# Patient Record
Sex: MALE | Race: WHITE | Employment: PART TIME | URBAN - METROPOLITAN AREA
[De-identification: names, ages, dates, MRNs, and addresses within clinical notes are randomized per-mention and may not be internally consistent; named-entity substitution may affect disease eponyms.]

---

## 2022-01-03 ENCOUNTER — HOSPITAL ENCOUNTER (EMERGENCY)
Age: 22
Discharge: HOME | End: 2022-01-03
Attending: EMERGENCY MEDICINE
Payer: COMMERCIAL

## 2022-01-03 VITALS
WEIGHT: 165 LBS | OXYGEN SATURATION: 98 % | HEART RATE: 89 BPM | HEIGHT: 68 IN | RESPIRATION RATE: 16 BRPM | TEMPERATURE: 99 F | SYSTOLIC BLOOD PRESSURE: 125 MMHG | DIASTOLIC BLOOD PRESSURE: 63 MMHG | BODY MASS INDEX: 25.01 KG/M2

## 2022-01-03 DIAGNOSIS — R22.0 SWELLING OF RIGHT SIDE OF FACE: Primary | ICD-10-CM

## 2022-01-03 PROCEDURE — 99283 EMERGENCY DEPT VISIT LOW MDM: CPT

## 2022-01-03 RX ORDER — PENICILLIN V POTASSIUM 500 MG/1
500 TABLET ORAL 4 TIMES DAILY
Qty: 40 TABLET | Refills: 0 | Status: ON HOLD | OUTPATIENT
Start: 2022-01-03 | End: 2022-01-08 | Stop reason: HOSPADM

## 2022-01-03 ASSESSMENT — PAIN DESCRIPTION - PAIN TYPE: TYPE: ACUTE PAIN

## 2022-01-03 ASSESSMENT — PAIN DESCRIPTION - LOCATION: LOCATION: TEETH

## 2022-01-03 ASSESSMENT — PAIN SCALES - GENERAL: PAINLEVEL_OUTOF10: 4

## 2022-01-03 NOTE — ED PROVIDER NOTES
[address]  eMERGENCY dEPARTMENT eNCOUnter   Independent Attestation     Pt Name: Franck Phillips  MRN: [id number]  [place] 2000  Date of evaluation: 1/3/22       Franck Phillips is a 24 y.o. male who presents with Dental Pain        Based on the medical record, the care appears appropriate. I was personally available for consultation in the Emergency Department.     Marc Elder MD  Attending Emergency  Physician                 Marc Elder MD  01/03/22 9497

## 2022-01-03 NOTE — ED PROVIDER NOTES
[address]  eMERGENCY dEPARTMENT eNCOUnter      Pt Name: Claude Sanderson  MRN: [id number]  [place] 2000  Date of evaluation: 1/3/22      CHIEF COMPLAINT:   Chief Complaint   Patient presents with    Dental Pain     HISTORY OF PRESENT ILLNESS    lCaude Sanderson is a 24 y.o. male who presents with complaints of right sided facial swelling x several days. Reports mild dental pain. Denies fever, chills, nausea, emesis, throat swelling. Pt did have right lower tooth pulled on 12/16. Denies any  Other complaints. REVIEW OF SYSTEMS     Constitutional: Denies recent fever, chills. Eyes: No visual changes. Neck: No neck pain. Respiratory: Denies recent shortness of breath. Cardiac:  Denies recent chest pain. GI: denies any recent abdominal pain nausea or vomiting. Denies Blood in the stool or black tarry stools. : denies dysuria. Musculoskeletal: Denies focal weakness. Neurologic: denies headache or focal weakness. Skin:  Denies any rash. Negative in 10 essential Systems except as mentioned above and in the HPI. PAST MEDICAL HISTORY   PMH:  has no past medical history on file. none otherwise stated in nurses notes  Surgical History:  has no past surgical history on file. none otherwise stated in nurses notes  Social History:   none otherwise stated in nurses notes  Family History: none otherwise stated in nurses notes  Psychiatric History: none otherwise stated in nurses notes    Allergies:has No Known Allergies. PHYSICAL EXAM     INITIAL VITALS: /63   Pulse 89   Temp 99 °F (37.2 °C)   Resp 16   Ht 5' 8\" (1.727 m)   Wt 165 lb (74.8 kg)   SpO2 98%   BMI 25.09 kg/m²   CONSTITUTIONAL: Vital signs reviewed, Alert and oriented X 3. HEAD: Atraumatic, Normocephalic. EYES: Eyes are normal to inspection, Pupils equal, round and reactive to light.    NECK: Normal ROM, No jugular venous distention, No meningeal signs, Cervical spine nontender. MOUTH:  + dental pain at 3 with right sided facial swelling, with no signs of abscess formation or Will sign noted. No swelling involving the airway at all. No trismus. Lips are normal.  No tongue elevation. No tenderness over floor of mouth. Controlling secretions. Speaking in full sentences. RESPIRATORY CHEST: No respiratory distress. ABDOMEN: Abdomen is nontender, No distension. UPPER EXTREMITY: Inspection normal, No cyanosis. NEURO: GCS is 15, Speech normal, Memory normal.   SKIN: Skin is warm, Skin is dry. PSYCHIATRIC: Oriented X 3, Normal affect. EMERGENCY DEPARTMENT COURSE:   antibiotic prescriptions. Pt provided with dental clinic list and instructed to call as soon as possible for an appointment. Instructed to return for worsening or any new symptoms including throat swelling, difficulty swallowing or breathing. Pt agrees. The care is provided during an unprecedented national emergency due to the novel coronavirus, COVID-19. FINAL IMPRESSION:     1.  Swelling of right side of face          DISPOSITION:  DISPOSITION          PATIENT REFERRED TO:  [facility] SPECIAL[facility] SURGERY  [address] [address]  [phone]  Call       [facility] [address]:  New Prescriptions    PENICILLIN V POTASSIUM (VEETID) 500 MG TABLET    Take 1 tablet by mouth 4 times daily for 10 days       (Please note that portions of this note were completed with a voice recognition program.  Efforts were made to edit the dictations but occasionally words are mis-transcribed.)    Kelly Jordan, PA-C  01/03/22 3555

## 2022-01-06 ENCOUNTER — HOSPITAL ENCOUNTER (INPATIENT)
Age: 22
LOS: 1 days | Discharge: HOME | DRG: 863 | End: 2022-01-08
Attending: EMERGENCY MEDICINE | Admitting: INTERNAL MEDICINE
Payer: COMMERCIAL

## 2022-01-06 ENCOUNTER — APPOINTMENT (OUTPATIENT)
Dept: CT IMAGING | Age: 22
DRG: 863 | End: 2022-01-06
Payer: COMMERCIAL

## 2022-01-06 DIAGNOSIS — K04.7 PERIAPICAL ABSCESS WITH FACIAL INVOLVEMENT: Primary | ICD-10-CM

## 2022-01-06 LAB
ABSOLUTE EOS #: 0 K/UL (ref 0–0.4)
ABSOLUTE IMMATURE GRANULOCYTE: ABNORMAL K/UL (ref 0–0.3)
ABSOLUTE LYMPH #: 1 K/UL (ref 1–4.8)
ABSOLUTE MONO #: 1.2 K/UL (ref 0.1–1.3)
ANION GAP SERPL CALCULATED.3IONS-SCNC: 14 MMOL/L (ref 9–17)
BASOPHILS # BLD: 0 % (ref 0–2)
BASOPHILS ABSOLUTE: 0 K/UL (ref 0–0.2)
BUN BLDV-MCNC: 11 MG/DL (ref 6–20)
BUN/CREAT BLD: ABNORMAL (ref 9–20)
CALCIUM SERPL-MCNC: 10.2 MG/DL (ref 8.6–10.4)
CHLORIDE BLD-SCNC: 99 MMOL/L (ref 98–107)
CO2: 27 MMOL/L (ref 20–31)
CREAT SERPL-MCNC: 0.74 MG/DL (ref 0.7–1.2)
DIFFERENTIAL TYPE: ABNORMAL
EOSINOPHILS RELATIVE PERCENT: 0 % (ref 0–4)
GFR AFRICAN AMERICAN: >60 ML/MIN
GFR NON-AFRICAN AMERICAN: >60 ML/MIN
GFR SERPL CREATININE-BSD FRML MDRD: ABNORMAL ML/MIN/{1.73_M2}
GFR SERPL CREATININE-BSD FRML MDRD: ABNORMAL ML/MIN/{1.73_M2}
GLUCOSE BLD-MCNC: 129 MG/DL (ref 70–99)
HCT VFR BLD CALC: 43.5 % (ref 41–53)
HEMOGLOBIN: 15.3 G/DL (ref 13.5–17.5)
IMMATURE GRANULOCYTES: ABNORMAL %
LYMPHOCYTES # BLD: 12 % (ref 25–45)
MCH RBC QN AUTO: 30.2 PG (ref 26–34)
MCHC RBC AUTO-ENTMCNC: 35.2 G/DL (ref 31–37)
MCV RBC AUTO: 85.7 FL (ref 80–100)
MONOCYTES # BLD: 14 % (ref 2–8)
NRBC AUTOMATED: ABNORMAL PER 100 WBC
PDW BLD-RTO: 12.6 % (ref 11.5–14.9)
PLATELET # BLD: 317 K/UL (ref 150–450)
PLATELET ESTIMATE: ABNORMAL
PMV BLD AUTO: 7.8 FL (ref 6–12)
POTASSIUM SERPL-SCNC: 4.2 MMOL/L (ref 3.7–5.3)
RBC # BLD: 5.07 M/UL (ref 4.5–5.9)
RBC # BLD: ABNORMAL 10*6/UL
SEG NEUTROPHILS: 74 % (ref 34–64)
SEGMENTED NEUTROPHILS ABSOLUTE COUNT: 6.5 K/UL (ref 1.3–9.1)
SODIUM BLD-SCNC: 140 MMOL/L (ref 135–144)
WBC # BLD: 8.8 K/UL (ref 4.5–13.5)
WBC # BLD: ABNORMAL 10*3/UL

## 2022-01-06 PROCEDURE — 2580000003 HC RX 258: Performed by: STUDENT IN AN ORGANIZED HEALTH CARE EDUCATION/TRAINING PROGRAM

## 2022-01-06 PROCEDURE — 2500000003 HC RX 250 WO HCPCS: Performed by: STUDENT IN AN ORGANIZED HEALTH CARE EDUCATION/TRAINING PROGRAM

## 2022-01-06 PROCEDURE — 85025 COMPLETE CBC W/AUTO DIFF WBC: CPT

## 2022-01-06 PROCEDURE — 36415 COLL VENOUS BLD VENIPUNCTURE: CPT

## 2022-01-06 PROCEDURE — 80048 BASIC METABOLIC PNL TOTAL CA: CPT

## 2022-01-06 PROCEDURE — 96365 THER/PROPH/DIAG IV INF INIT: CPT

## 2022-01-06 PROCEDURE — 99283 EMERGENCY DEPT VISIT LOW MDM: CPT

## 2022-01-06 PROCEDURE — 6360000004 HC RX CONTRAST MEDICATION: Performed by: STUDENT IN AN ORGANIZED HEALTH CARE EDUCATION/TRAINING PROGRAM

## 2022-01-06 PROCEDURE — 70487 CT MAXILLOFACIAL W/DYE: CPT

## 2022-01-06 RX ORDER — 0.9 % SODIUM CHLORIDE 0.9 %
80 INTRAVENOUS SOLUTION INTRAVENOUS ONCE
Status: COMPLETED | OUTPATIENT
Start: 2022-01-06 | End: 2022-01-06

## 2022-01-06 RX ORDER — SODIUM CHLORIDE 0.9 % (FLUSH) 0.9 %
10 SYRINGE (ML) INJECTION PRN
Status: DISCONTINUED | OUTPATIENT
Start: 2022-01-06 | End: 2022-01-08 | Stop reason: HOSPADM

## 2022-01-06 RX ORDER — CLINDAMYCIN PHOSPHATE 600 MG/50ML
600 INJECTION INTRAVENOUS ONCE
Status: COMPLETED | OUTPATIENT
Start: 2022-01-06 | End: 2022-01-06

## 2022-01-06 RX ADMIN — IOPAMIDOL 75 ML: 755 INJECTION, SOLUTION INTRAVENOUS at 21:27

## 2022-01-06 RX ADMIN — CLINDAMYCIN PHOSPHATE 600 MG: 600 INJECTION, SOLUTION INTRAVENOUS at 20:55

## 2022-01-06 RX ADMIN — SODIUM CHLORIDE 80 ML: 9 INJECTION, SOLUTION INTRAVENOUS at 21:28

## 2022-01-06 RX ADMIN — SODIUM CHLORIDE, PRESERVATIVE FREE 10 ML: 5 INJECTION INTRAVENOUS at 21:29

## 2022-01-06 ASSESSMENT — ENCOUNTER SYMPTOMS
VOMITING: 0
SHORTNESS OF BREATH: 0
COUGH: 0
VOICE CHANGE: 0
ABDOMINAL PAIN: 0
TROUBLE SWALLOWING: 0
FACIAL SWELLING: 1
DIARRHEA: 0
NAUSEA: 0
CONSTIPATION: 0

## 2022-01-06 ASSESSMENT — PAIN SCALES - GENERAL: PAINLEVEL_OUTOF10: 4

## 2022-01-06 NOTE — ED NOTES
Mode of arrival (squad #, walk in, police, etc) : walk in        Chief complaint(s): facial swelling        Arrival Note (brief scenario, treatment PTA, etc). : Pt with dental abcess and swelling. Pt was seen on Monday and put on PenVK. Pt saw dentist today who recommended he come to the ER for IV ATB. C= \"Have you ever felt that you should Cut down on your drinking? \"  No  A= \"Have people Annoyed you by criticizing your drinking? \"  No  G= \"Have you ever felt bad or Guilty about your drinking? \"  No  E= \"Have you ever had a drink as an Eye-opener first thing in the morning to steady your nerves or to help a hangover? \"  No      Deferred []      Reason for deferring: N/A    *If yes to two or more: probable alcohol abuse. Russ Rodriguez RN  01/06/22 6753

## 2022-01-06 NOTE — LETTER
[address] [address]  Phone: [phone]            January 8, 2022     Patient: Merilee Bumpers [id number]   YOB: 2000   Date of Visit: 1/6/2022       To Whom It May Concern:    Jamiie Carl was admitted to [facility] on 1/6/2022 and discharged on 1/8/2022. If you have any questions or concerns, please don't hesitate to call.     Sincerely,      Smooth Luis RN

## 2022-01-07 PROBLEM — L02.01 FACIAL ABSCESS: Status: ACTIVE | Noted: 2022-01-07

## 2022-01-07 PROCEDURE — 6360000002 HC RX W HCPCS: Performed by: STUDENT IN AN ORGANIZED HEALTH CARE EDUCATION/TRAINING PROGRAM

## 2022-01-07 PROCEDURE — 2580000003 HC RX 258: Performed by: STUDENT IN AN ORGANIZED HEALTH CARE EDUCATION/TRAINING PROGRAM

## 2022-01-07 PROCEDURE — 2580000003 HC RX 258: Performed by: FAMILY MEDICINE

## 2022-01-07 PROCEDURE — 6360000002 HC RX W HCPCS: Performed by: FAMILY MEDICINE

## 2022-01-07 PROCEDURE — 2060000000 HC ICU INTERMEDIATE R&B

## 2022-01-07 RX ORDER — IBUPROFEN 800 MG/1
800 TABLET ORAL EVERY 8 HOURS PRN
Status: DISCONTINUED | OUTPATIENT
Start: 2022-01-07 | End: 2022-01-08 | Stop reason: HOSPADM

## 2022-01-07 RX ORDER — ACETAMINOPHEN 325 MG/1
650 TABLET ORAL EVERY 4 HOURS PRN
Status: DISCONTINUED | OUTPATIENT
Start: 2022-01-07 | End: 2022-01-08 | Stop reason: HOSPADM

## 2022-01-07 RX ORDER — ONDANSETRON 2 MG/ML
4 INJECTION INTRAMUSCULAR; INTRAVENOUS EVERY 6 HOURS PRN
Status: DISCONTINUED | OUTPATIENT
Start: 2022-01-07 | End: 2022-01-08 | Stop reason: HOSPADM

## 2022-01-07 RX ORDER — ONDANSETRON 4 MG/1
4 TABLET, ORALLY DISINTEGRATING ORAL EVERY 8 HOURS PRN
Status: DISCONTINUED | OUTPATIENT
Start: 2022-01-07 | End: 2022-01-08 | Stop reason: HOSPADM

## 2022-01-07 RX ORDER — SODIUM CHLORIDE 9 MG/ML
25 INJECTION, SOLUTION INTRAVENOUS PRN
Status: DISCONTINUED | OUTPATIENT
Start: 2022-01-07 | End: 2022-01-08 | Stop reason: HOSPADM

## 2022-01-07 RX ORDER — SODIUM CHLORIDE 0.9 % (FLUSH) 0.9 %
5-40 SYRINGE (ML) INJECTION PRN
Status: DISCONTINUED | OUTPATIENT
Start: 2022-01-07 | End: 2022-01-08 | Stop reason: HOSPADM

## 2022-01-07 RX ORDER — SODIUM CHLORIDE 0.9 % (FLUSH) 0.9 %
5-40 SYRINGE (ML) INJECTION EVERY 12 HOURS SCHEDULED
Status: DISCONTINUED | OUTPATIENT
Start: 2022-01-07 | End: 2022-01-08 | Stop reason: HOSPADM

## 2022-01-07 RX ADMIN — AMPICILLIN SODIUM AND SULBACTAM SODIUM 3000 MG: 2; 1 INJECTION, POWDER, FOR SOLUTION INTRAMUSCULAR; INTRAVENOUS at 21:03

## 2022-01-07 RX ADMIN — Medication 10 ML: at 09:00

## 2022-01-07 RX ADMIN — AMPICILLIN SODIUM AND SULBACTAM SODIUM 3000 MG: 2; 1 INJECTION, POWDER, FOR SOLUTION INTRAMUSCULAR; INTRAVENOUS at 14:00

## 2022-01-07 RX ADMIN — VANCOMYCIN HYDROCHLORIDE 1750 MG: 1 INJECTION, POWDER, LYOPHILIZED, FOR SOLUTION INTRAVENOUS at 00:27

## 2022-01-07 ASSESSMENT — PAIN SCALES - GENERAL
PAINLEVEL_OUTOF10: 0
PAINLEVEL_OUTOF10: 0

## 2022-01-07 ASSESSMENT — ENCOUNTER SYMPTOMS
COLOR CHANGE: 0
PHOTOPHOBIA: 0
RECTAL PAIN: 0
ABDOMINAL DISTENTION: 0
VOICE CHANGE: 0
CHOKING: 0
ANAL BLEEDING: 0
FACIAL SWELLING: 0
APNEA: 0
EYE ITCHING: 0
STRIDOR: 0
CHEST TIGHTNESS: 0

## 2022-01-07 NOTE — CARE COORDINATION
CASE MANAGEMENT NOTE:    Admission Date:  1/6/2022 Michelle Folres is a 24 y.o.  male    Admitted for : Facial abscess [L02.01]  Periapical abscess with facial involvement [K04.7]    Met with:  Patient    PCP:  Kristi Tyson                                Insurance:  UMR      Is patient alert and oriented at time of discussion:  Yes    Current Residence/ Living Arrangements:  independently at home w/ Fiance           Current Services PTA:  No    Does patient go to outpatient dialysis: No  If yes, location and chair time: NA    Is patient agreeable to VNS: No    Freedom of choice provided:  No    List of [address] provided: No    VNS chosen:  No    DME:  none    Home Oxygen: No    Nebulizer: No    CPAP/BIPAP: No    Supplier: N/A    Potential Assistance Needed: No    SNF needed: No    Freedom of choice and list provided: NA    Pharmacy:  [facility] on [address]       Does Patient want to use MEDS to BEDS? No    Is patient currently receiving oral anticoagulation therapy? No    Is the Patient an [facility] with Readmission Risk Score greater than 14%? No  If yes, pt needs a follow up appointment made within 7 days. Family Members/Caregivers that pt would like involved in their care:    Yes    If yes, list name here:  MomMichael     Transportation Provider:  Patient             Discharge Plan:  1/7/22 UMR Pt. Lives in [facility] w/ Fiance. No DME, Denies VNS, Had Molar pulled on 12/16, placed on PCN, not effective. Needs IV antibiotics, F/U w/ Oral Surgeon, as outpt.  Denies needs, will follow//KB                 Electronically signed by: Oneal Tam RN on 1/7/2022 at 11:37 AM

## 2022-01-07 NOTE — ED NOTES
Nurse to Nurse report called to [facility], RN. Pt to floor via wheelchair.      Terry Rhodes RN  01/07/22 9346

## 2022-01-07 NOTE — PLAN OF CARE
Problem: Falls - Risk of:  Goal: Will remain free from falls  Description: Will remain free from falls  Outcome: Ongoing   No falls this shift      Problem: Falls - Risk of:  Goal: Absence of physical injury  Description: Absence of physical injury  Outcome: Ongoing   No injury this shift  Problem: Nutritional:  Goal: Nutritional status will improve  Description: Nutritional status will improve  Outcome: Ongoing   Patient able to tolerate PO Food/Fluids. Problem: Physical Regulation:  Goal: Will remain free from infection  Description: Will remain free from infection  Outcome: Ongoing   Patient tolerating IV antibiotics without complications this shift.

## 2022-01-07 NOTE — ED PROVIDER NOTES
[address]  Emergency Department Encounter  Emergency Medicine Resident     Pt Name: Michelle Flores  FWA:[id number]  [place] 2000  Date of evaluation: 1/6/22  PCP:  Margaret Hurtado       Chief Complaint   Patient presents with    Facial Swelling    Dental Problem     HISTORY OF PRESENT ILLNESS  (Location/Symptom, Timing/Onset, Context/Setting, Quality, Duration, ModifyingFactors, Severity.)      Michelle Flores is a 24 y.o. male who presents for evaluation of facial swelling. Patient had inferior first molar pulled 12/16. Seen in the ED Monday and started on penicillin however had worsening facial swelling. Saw his dentist today who recommended that he come to the ED. Patient states that dentist felt in his mouth and afterward had a \"metallic taste\" with slight improvement in symptoms following. Taking NSAIDs at home for pain. Reports chills but no fever, chest pain, shortness of breath, trismus, difficulty swallowing, difficulty breathing, changes in vision. PAST MEDICAL / SURGICAL / SOCIAL /FAMILY HISTORY      has no past medical history on file. No other pertinent PMH on review with patient/guardian. has no past surgical history on file. No other pertinent PSH on review with patient/guardian. Social History     Socioeconomic History    Marital status: Single     Spouse name: Not on file    Number of children: Not on file    Years of education: Not on file    Highest education level: Not on file   Occupational History    Not on file   Tobacco Use    Smoking status: Current Every Day Smoker    Smokeless tobacco: Never Used   Substance and Sexual Activity    Alcohol use:  Yes    Drug use: Yes     Types: Marijuana Christine Abbey)    Sexual activity: Never   Other Topics Concern    Not on file   Social History Narrative    Not on file     Social Determinants of Health     Financial Resource Strain:     Difficulty of Paying Living Expenses: Not on file   Food Insecurity:     Worried About Running Out of Food in the Last Year: Not on file    Pamela of Food in the Last Year: Not on file   Transportation Needs:     Lack of Transportation (Medical): Not on file    Lack of Transportation (Non-Medical): Not on file   Physical Activity:     Days of Exercise per Week: Not on file    Minutes of Exercise per Session: Not on file   Stress:     Feeling of Stress : Not on file   Social Connections:     Frequency of Communication with Friends and Family: Not on file    Frequency of Social Gatherings with Friends and Family: Not on file    Attends Rastafari Services: Not on file    Active Member of [address] [place] [address] Auto I.D. or Organizations: Not on file    Attends Club or Organization Meetings: Not on file    Marital Status: Not on file   Intimate Partner Violence:     Fear of Current or Ex-Partner: Not on file    Emotionally Abused: Not on file    Physically Abused: Not on file    Sexually Abused: Not on file   Housing Stability:     Unable to Pay for Housing in the Last Year: Not on file    Number of [place] in the Last Year: Not on file    Unstable Housing in the Last Year: Not on file     I counseled the patient against using tobacco products. History reviewed. No pertinent family history. No other pertinent FamHx on review with patient/guardian. Allergies:  Patient has no known allergies. Home Medications:  Prior to Admission medications    Medication Sig Start Date End Date Taking? Authorizing Provider   penicillin v potassium (VEETID) 500 MG tablet Take 1 tablet by mouth 4 times daily for 10 days 1/3/22 1/13/22  Nicholas Callejas PA-C       REVIEW OF SYSTEMS    (2-9 systems for level 4, 10 ormore for level 5)      Review of Systems   Constitutional: Negative for appetite change, chills and fever. HENT: Positive for dental problem and facial swelling. Negative for ear pain, trouble swallowing and voice change.     Eyes: Negative for visual disturbance. Respiratory: Negative for cough and shortness of breath. Cardiovascular: Negative for chest pain. Gastrointestinal: Negative for abdominal pain, constipation, diarrhea, nausea and vomiting. Skin: Negative for rash and wound. Allergic/Immunologic: Negative for immunocompromised state. Neurological: Negative for headaches. Hematological: Does not bruise/bleed easily. PHYSICAL EXAM   (up to 7 for level 4, 8 or more for level 5)      INITIAL VITALS:   /72   Pulse 92   Temp 98.2 °F (36.8 °C) (Temporal)   Resp 16   Ht 5' 8\" (1.727 m)   Wt 165 lb (74.8 kg)   SpO2 98%   BMI 25.09 kg/m²     Physical Exam  Constitutional:       General: He is not in acute distress. Appearance: Normal appearance. He is not ill-appearing, toxic-appearing or diaphoretic. HENT:      Head: Normocephalic and atraumatic. Right Ear: External ear normal.      Left Ear: External ear normal.      Mouth/Throat:      Comments: Two abscesses of right cheek inside the mouth. No periapical abscess palpated. No trismus. Floor mouth is soft. Eyes:      General:         Right eye: No discharge. Left eye: No discharge. Cardiovascular:      Rate and Rhythm: Normal rate and regular rhythm. Pulses: Normal pulses. Heart sounds: No murmur heard. Pulmonary:      Effort: Pulmonary effort is normal. No respiratory distress. Breath sounds: Normal breath sounds. No wheezing, rhonchi or rales. Skin:     Capillary Refill: Capillary refill takes less than 2 seconds. Neurological:      General: No focal deficit present. Mental Status: He is alert.          DIFFERENTIAL  DIAGNOSIS     PLAN (LABS / IMAGING / EKG):  Orders Placed This Encounter   Procedures    CT FACIAL BONES W CONTRAST    CBC Auto Differential    Basic Metabolic Panel w/ Reflex to MG    Inpatient consult to Family Practice       MEDICATIONS ORDERED:  Orders Placed This Encounter   Medications    American >60 >60 mL/min    GFR African American >60 >60 mL/min    GFR Comment          GFR Staging NOT REPORTED        IMPRESSION/MDM/ED COURSE:  24 y.o. male presented with facial swelling/dental pain after having his tooth pulled. Patient afebrile vitals WNL. On exam patient resting company no acute distress, nontoxic appearing. Two abscesses of right cheek inside the mouth. No periapical abscess palpated. No voice changes or difficulty swallowing. Floor of mouth is soft without concern for Will's. Will obtain basic labs and CT to evaluate extent of infection. IV clindamycin. Will perform dental block and repeat exam.  Patient declining analgesics at this time. ED Course as of 01/07/22 0038   Thu Jan 06, 2022 2158 CBC Auto Differential(!):    WBC 8.8   RBC 5.07   Hemoglobin Quant 15.3   Hematocrit 43.5   MCV 85.7   MCH 30.2   MCHC 35.2   RDW 12.6   Platelet Count 323   MPV 7.8   NRBC Automated NOT REPORTED   Differential Type NOT REPORTED   Seg Neutrophils 74(!)   Lymphocytes 12(!)   Monocytes 14(!)   Eosinophils % 0   Basophils 0   Immature Granulocytes NOT REPORTED   Segs Absolute 6.50   Absolute Lymph # 1.00   Absolute Mono # 1.20   Absolute Eos # 0.00   Basophils Absolute 0.00   Absolute Immature Granulocyte NOT REPORTED   WBC Morphology NOT REPORTED   RBC Morphology NOT REPORTED   Platelet Estimate NOT REPORTED [AF]   8560 Basic Metabolic Panel w/ Reflex to MG(!):    Glucose 129(!)   BUN 11   Creatinine 0.74   Bun/Cre Ratio NOT REPORTED   CALCIUM, SERUM, 565584 10.2   Sodium 140   Potassium 4.2   Chloride 99   CO2 27   Anion Gap 14   GFR Non- >60   GFR  >60   GFR Comment        GFR Staging NOT REPORTED [AF]   2159   IMPRESSION:  Right 2nd molar dental disease identified with apical lucency and odontogenic  origin of the right maxillary sinus disease.   Buckle cellulitis and phlegmon  and abscess extends into the retro antral fat pad on the right.     Dental consultation recommended. [AF]   Fri Jan 07, 2022   0003 After multiple discussions with patient, his father, his girlfriend. Patient was agreeable to staying. There are no hospitals in [facility] with ENT or dentist on staff that are accepting patients. Patient is unwilling to go to tertiary center in [facility]. Will admit for IV antibiotics. [AF]   0030 Spoke to Dr. Salomon Gruber who accepted patient for admission. [AF]      ED Course User Index  [AF] Tiffany Caceres DO     RADIOLOGY:  CT FACIAL BONES W CONTRAST   Final Result   Right 2nd molar dental disease identified with apical lucency and odontogenic   origin of the right maxillary sinus disease. Buckle cellulitis and phlegmon   and abscess extends into the retro antral fat pad on the right. Dental consultation recommended. RECOMMENDATIONS:   Unavailable           EKG  None    All EKG's are interpreted by the Emergency Department Physician who either signs or Co-signs this chart in the absence of a cardiologist.    PROCEDURES:  None    CONSULTS:  IP CONSULT TO FAMILY MEDICINE    FINAL IMPRESSION      1. Periapical abscess with facial involvement          DISPOSITION / PLAN     DISPOSITION Decision To Admit 01/07/2022 12:04:50 AM      PATIENT REFERREDTO:  No follow-up provider specified.     DISCHARGE MEDICATIONS:  New Prescriptions    No medications on file       Lola Claros DO  PGY 2  Resident Physician Emergency Medicine  01/07/22 12:38 AM    (Please note that portions of this note were completed with a voice recognition program.Efforts were made to edit the dictations but occasionally words are mis-transcribed.)       Tiffany Caceres DO  Resident  01/07/22 5284

## 2022-01-07 NOTE — H&P
History and Physical      Name: Phillip Braga  MRN: [id number]     Acct: [de-identified]  Room: [address] [place] [address]    Admit Date: 1/6/2022  PCP: Edmonia Galeazzi      Chief Complaint:     Chief Complaint   Patient presents with    Facial Swelling    Dental Problem       History Obtained From:     patient, electronic medical record    History of Present Illness:      Phillip Braga is a  24 y.o.  male who presents with Facial Swelling and Dental Problem  Patient had inferior first molar pulled 12/16. Seen in the ED Monday and started on penicillin however had worsening facial swelling. Saw his dentist today who recommended that he come to the ED. Patient states that dentist felt in his mouth and afterward had a \"metallic taste\" with slight improvement in symptoms following. Taking NSAIDs at home for pain. Reports chills but no fever, chest pain, shortness of breath, trismus, difficulty swallowing, difficulty breathing, changes in vision. Past Medical History:     History reviewed. No pertinent past medical history. Past Surgical History:     History reviewed. No pertinent surgical history. Medications Prior to Admission:       Prior to Admission medications    Medication Sig Start Date End Date Taking? Authorizing Provider   penicillin v potassium (VEETID) 500 MG tablet Take 1 tablet by mouth 4 times daily for 10 days 1/3/22 1/13/22  Celestina Montes PA-C        Allergies:       Patient has no known allergies. Social History:     Tobacco:    reports that he has been smoking. He has never used smokeless tobacco.  Alcohol:      reports current alcohol use of about 1.0 standard drink of alcohol per week. Drug Use:  reports current drug use. Drug: Marijuana Gelene Chasten).     Family History:     Family History   Problem Relation Age of Onset    Kidney Disease Mother     High Blood Pressure Mother     Allergy (Severe) Father     Other Father     Arthritis Sister     Asthma Sister    [place] Asthma Brother     Other Maternal Grandmother     Kidney Disease Maternal Grandfather     Heart Disease Paternal Grandfather        Review of Systems:     Positive and Negative as described in HPI   all 10 systems are reviewed and negative except as Noted      Review of Systems   Constitutional: Negative for appetite change and fatigue. HENT: Negative for drooling, facial swelling, mouth sores, sneezing and voice change. Eyes: Negative for photophobia and itching. Respiratory: Negative for apnea, choking, chest tightness and stridor. Cardiovascular: Negative for chest pain, palpitations and leg swelling. Gastrointestinal: Negative for abdominal distention, anal bleeding and rectal pain. Endocrine: Negative for polydipsia and polyuria. Genitourinary: Negative for difficulty urinating, flank pain, frequency and urgency. Musculoskeletal: Negative for gait problem, joint swelling, myalgias and neck stiffness. Skin: Negative for color change and wound. Allergic/Immunologic: Negative for food allergies. Neurological: Negative for seizures, facial asymmetry, weakness and headaches. Hematological: Negative for adenopathy. Does not bruise/bleed easily. Code Status:  Full Code    Physical Exam:     Vitals:  /84   Pulse 87   Temp 98.5 °F (36.9 °C) (Oral)   Resp 16   Ht 5' 8\" (1.727 m)   Wt 149 lb 14.6 oz (68 kg)   SpO2 96%   BMI 22.79 kg/m²   Temp (24hrs), Av.1 °F (36.7 °C), Min:97.9 °F (36.6 °C), Max:98.5 °F (36.9 °C)        Physical Exam  Vitals reviewed. Constitutional:       General: He is not in acute distress. Appearance: He is well-developed. He is not diaphoretic. HENT:      Head: Normocephalic and atraumatic. Right Ear: External ear normal.      Left Ear: External ear normal.      Nose: Nose normal.      Mouth/Throat:      Pharynx: Oropharynx is clear.    Eyes:      General: Lids are normal.      Conjunctiva/sclera: Conjunctivae normal.   Neck: Trachea: No tracheal deviation. Cardiovascular:      Rate and Rhythm: Normal rate and regular rhythm. Heart sounds: Normal heart sounds, S1 normal and S2 normal.   Pulmonary:      Effort: Pulmonary effort is normal. No respiratory distress. Breath sounds: Normal breath sounds. No decreased breath sounds, wheezing or rhonchi. Abdominal:      General: Bowel sounds are normal. There is no distension. Palpations: Abdomen is soft. Tenderness: There is no abdominal tenderness. Musculoskeletal:         General: No tenderness or deformity. Cervical back: Normal range of motion and neck supple. Lymphadenopathy:      Cervical: No cervical adenopathy. Skin:     General: Skin is warm and dry. Capillary Refill: Capillary refill takes less than 2 seconds. Nails: There is no clubbing. Neurological:      Mental Status: He is alert. Mental status is at baseline. Motor: No abnormal muscle tone or seizure activity.       Coordination: Coordination normal.               Data:     Recent Results (from the past 24 hour(s))   CBC Auto Differential    Collection Time: 01/06/22  8:53 PM   Result Value Ref Range    WBC 8.8 4.5 - 13.5 k/uL    RBC 5.07 4.5 - 5.9 m/uL    Hemoglobin 15.3 13.5 - 17.5 g/dL    Hematocrit 43.5 41 - 53 %    MCV 85.7 80 - 100 fL    MCH 30.2 26 - 34 pg    MCHC 35.2 31 - 37 g/dL    RDW 12.6 11.5 - 14.9 %    Platelets 748 844 - 191 k/uL    MPV 7.8 6.0 - 12.0 fL    NRBC Automated NOT REPORTED per 100 WBC    Differential Type NOT REPORTED     Seg Neutrophils 74 (H) 34 - 64 %    Lymphocytes 12 (L) 25 - 45 %    Monocytes 14 (H) 2 - 8 %    Eosinophils % 0 0 - 4 %    Basophils 0 0 - 2 %    Immature Granulocytes NOT REPORTED 0 %    Segs Absolute 6.50 1.3 - 9.1 k/uL    Absolute Lymph # 1.00 1.0 - 4.8 k/uL    Absolute Mono # 1.20 0.1 - 1.3 k/uL    Absolute Eos # 0.00 0.0 - 0.4 k/uL    Basophils Absolute 0.00 0.0 - 0.2 k/uL    Absolute Immature Granulocyte NOT REPORTED 0.00 - 0.30 k/uL    WBC Morphology NOT REPORTED     RBC Morphology NOT REPORTED     Platelet Estimate NOT REPORTED    Basic Metabolic Panel w/ Reflex to MG    Collection Time: 01/06/22  8:53 PM   Result Value Ref Range    Glucose 129 (H) 70 - 99 mg/dL    BUN 11 6 - 20 mg/dL    CREATININE 0.74 0.70 - 1.20 mg/dL    Bun/Cre Ratio NOT REPORTED 9 - 20    Calcium 10.2 8.6 - 10.4 mg/dL    Sodium 140 135 - 144 mmol/L    Potassium 4.2 3.7 - 5.3 mmol/L    Chloride 99 98 - 107 mmol/L    CO2 27 20 - 31 mmol/L    Anion Gap 14 9 - 17 mmol/L    GFR Non-African American >60 >60 mL/min    GFR African American >60 >60 mL/min    GFR Comment          GFR Staging NOT REPORTED        Assesment:     Primary Problem  Facial abscess    Principal Problem:    Facial abscess  Resolved Problems:    * No resolved hospital problems. *      Plan:     1. Iv unasyn  2. DVT prophylaxis lovenox  3.   EPCs  4.  check and replace electrolytes per sliding scale  5.  restart home medications        Electronically signed by Haseeb Kinney MD     Copy sent to Dr. Nato Aguilar

## 2022-01-08 VITALS
OXYGEN SATURATION: 97 % | DIASTOLIC BLOOD PRESSURE: 71 MMHG | WEIGHT: 149.91 LBS | HEIGHT: 68 IN | SYSTOLIC BLOOD PRESSURE: 119 MMHG | TEMPERATURE: 98.5 F | RESPIRATION RATE: 18 BRPM | BODY MASS INDEX: 22.72 KG/M2 | HEART RATE: 84 BPM

## 2022-01-08 LAB
ALBUMIN SERPL-MCNC: 4 G/DL (ref 3.5–5.2)
ALBUMIN/GLOBULIN RATIO: ABNORMAL (ref 1–2.5)
ALP BLD-CCNC: 68 U/L (ref 40–129)
ALT SERPL-CCNC: 24 U/L (ref 5–41)
ANION GAP SERPL CALCULATED.3IONS-SCNC: 13 MMOL/L (ref 9–17)
AST SERPL-CCNC: 20 U/L
BILIRUB SERPL-MCNC: 0.73 MG/DL (ref 0.3–1.2)
BUN BLDV-MCNC: 14 MG/DL (ref 6–20)
BUN/CREAT BLD: ABNORMAL (ref 9–20)
CALCIUM SERPL-MCNC: 10 MG/DL (ref 8.6–10.4)
CHLORIDE BLD-SCNC: 102 MMOL/L (ref 98–107)
CO2: 27 MMOL/L (ref 20–31)
CREAT SERPL-MCNC: 0.81 MG/DL (ref 0.7–1.2)
GFR AFRICAN AMERICAN: >60 ML/MIN
GFR NON-AFRICAN AMERICAN: >60 ML/MIN
GFR SERPL CREATININE-BSD FRML MDRD: ABNORMAL ML/MIN/{1.73_M2}
GFR SERPL CREATININE-BSD FRML MDRD: ABNORMAL ML/MIN/{1.73_M2}
GLUCOSE BLD-MCNC: 102 MG/DL (ref 70–99)
HCT VFR BLD CALC: 42.3 % (ref 41–53)
HEMOGLOBIN: 14.7 G/DL (ref 13.5–17.5)
MCH RBC QN AUTO: 30.3 PG (ref 26–34)
MCHC RBC AUTO-ENTMCNC: 34.8 G/DL (ref 31–37)
MCV RBC AUTO: 87.1 FL (ref 80–100)
NRBC AUTOMATED: ABNORMAL PER 100 WBC
PDW BLD-RTO: 12.9 % (ref 11.5–14.9)
PLATELET # BLD: 301 K/UL (ref 150–450)
PMV BLD AUTO: 8.2 FL (ref 6–12)
POTASSIUM SERPL-SCNC: 4.1 MMOL/L (ref 3.7–5.3)
RBC # BLD: 4.86 M/UL (ref 4.5–5.9)
SODIUM BLD-SCNC: 142 MMOL/L (ref 135–144)
TOTAL PROTEIN: 7.6 G/DL (ref 6.4–8.3)
WBC # BLD: 3.6 K/UL (ref 4.5–13.5)

## 2022-01-08 PROCEDURE — 85027 COMPLETE CBC AUTOMATED: CPT

## 2022-01-08 PROCEDURE — 80053 COMPREHEN METABOLIC PANEL: CPT

## 2022-01-08 PROCEDURE — 36415 COLL VENOUS BLD VENIPUNCTURE: CPT

## 2022-01-08 PROCEDURE — 2580000003 HC RX 258: Performed by: FAMILY MEDICINE

## 2022-01-08 PROCEDURE — 6360000002 HC RX W HCPCS: Performed by: FAMILY MEDICINE

## 2022-01-08 RX ORDER — AMOXICILLIN AND CLAVULANATE POTASSIUM 875; 125 MG/1; MG/1
1 TABLET, FILM COATED ORAL 2 TIMES DAILY
Qty: 14 TABLET | Refills: 0 | Status: SHIPPED | OUTPATIENT
Start: 2022-01-08 | End: 2022-01-15

## 2022-01-08 RX ADMIN — AMPICILLIN SODIUM AND SULBACTAM SODIUM 3000 MG: 2; 1 INJECTION, POWDER, FOR SOLUTION INTRAMUSCULAR; INTRAVENOUS at 08:35

## 2022-01-08 RX ADMIN — AMPICILLIN SODIUM AND SULBACTAM SODIUM 3000 MG: 2; 1 INJECTION, POWDER, FOR SOLUTION INTRAMUSCULAR; INTRAVENOUS at 15:09

## 2022-01-08 RX ADMIN — AMPICILLIN SODIUM AND SULBACTAM SODIUM 3000 MG: 2; 1 INJECTION, POWDER, FOR SOLUTION INTRAMUSCULAR; INTRAVENOUS at 02:35

## 2022-01-08 NOTE — DISCHARGE SUMMARY
[facility]    Discharge Summary     Patient ID: Alta Corona  :  2000   MRN: [id number]     ACCOUNT:  [de-identified]   Patient's PCP: Jayashree Marroquin  Admit Date: 2022   Discharge Date: 2022    Length of Stay: 1  Code Status:  Full Code  Admitting Physician: Lois Barton MD  Discharge Physician: Gaudencio Coulter MD     Active Discharge Diagnoses:     Hospital Problem Lists:  Principal Problem:    Facial abscess  Resolved Problems:    * No resolved hospital problems. *      Admission Condition:  poor     Discharged Condition: good    Hospital Stay:     Hospital Course:  Alta Corona is a 24 y.o. male who was admitted for the management of   Facial abscess , presented to ER with Facial Swelling and Dental Problem  Was treated with IV Unasyn. Symptoms improved. Was discharged on oral Augmentin. Significant Diagnostic Studies:   Radiology:  CT FACIAL BONES W CONTRAST    Result Date: 2022  Right 2nd molar dental disease identified with apical lucency and odontogenic origin of the right maxillary sinus disease. Buckle cellulitis and phlegmon and abscess extends into the retro antral fat pad on the right. Dental consultation recommended. RECOMMENDATIONS: Unavailable       Consultations:    Consults:     Final Specialist Recommendations/Findings:   PHARMACY TO DOSE MEDICATION  IP CONSULT TO FAMILY MEDICINE      The patient was seen and examined on day of discharge and this discharge summary is in conjunction with any daily progress note from day of discharge. Discharge plan:     Disposition: Home    Physician Follow Up:        Activity: As tolerated    Discharge Medications:      Medication List        START taking these medications      amoxicillin-clavulanate 875-125 MG per tablet  Commonly known as: AUGMENTIN  Take 1 tablet by mouth 2 times daily for 7 days            STOP taking these medications penicillin v potassium 500 MG tablet  Commonly known as: VEETID               Where to Get Your Medications        These medications were sent to [facility], [address] Becerra [address]      Phone: [phone]   amoxicillin-clavulanate 875-125 MG per tablet         No discharge procedures on file. Time Spent on discharge is  31 mins in patient examination, evaluation, counseling as well as medication reconciliation, prescriptions for required medications, discharge plan and follow up. Electronically signed by   Yanet Alvarenga MD  1/8/2022  5:15 PM      Thank you Dr. Nichelle David for the opportunity to be involved in this patient's care.

## 2022-01-08 NOTE — FLOWSHEET NOTE
01/08/22 1459   Encounter Summary   Services provided to: Patient   Referral/Consult From: Rounding   Complexity of Encounter Low   Length of Encounter 15 minutes   Spiritual/Yazidi   Type Spiritual support   Assessment Sleeping   Intervention Prayer

## 2022-01-08 NOTE — PLAN OF CARE
Problem: Falls - Risk of:  Goal: Will remain free from falls  Description: Will remain free from falls  1/8/2022 0218 by Anselmo De Los Santos RN  Outcome: Ongoing  Note: Patient alert and oriented. Up per self with steady gait. 1/7/2022 1842 by Karena Dandy, RN  Outcome: Ongoing  Goal: Absence of physical injury  Description: Absence of physical injury  1/8/2022 0218 by Anselmo De Los Santos RN  Outcome: Ongoing  1/7/2022 1842 by Karena Dandy, RN  Outcome: Ongoing     Problem: Nutritional:  Goal: Nutritional status will improve  Description: Nutritional status will improve  1/8/2022 0218 by Anselmo De Los Santos RN  Outcome: Ongoing  1/7/2022 1842 by Karena Dandy, RN  Outcome: Ongoing     Problem: Physical Regulation:  Goal: Will remain free from infection  Description: Will remain free from infection  1/8/2022 0218 by Anselmo De Los Santos RN  Outcome: Ongoing  Note: Patient afebrile. Slight left facial swelling. Antibiotics given as ordered. 1/7/2022 1842 by Karena Dandy, RN  Outcome: Ongoing     Problem: Falls - Risk of:  Goal: Will remain free from falls  Description: Will remain free from falls  1/8/2022 0218 by Anselmo De Los Santos RN  Outcome: Ongoing  Note: Patient alert and oriented. Up per self with steady gait. 1/7/2022 1842 by Karena Dandy, RN  Outcome: Ongoing  Goal: Absence of physical injury  Description: Absence of physical injury  1/8/2022 0218 by Anselmo De Los Santos RN  Outcome: Ongoing  1/7/2022 1842 by Karena Dandy, RN  Outcome: Ongoing     Problem: Nutritional:  Goal: Nutritional status will improve  Description: Nutritional status will improve  1/8/2022 0218 by Anselmo De Los Santos RN  Outcome: Ongoing  1/7/2022 1842 by Karena Dandy, RN  Outcome: Ongoing     Problem: Physical Regulation:  Goal: Will remain free from infection  Description: Will remain free from infection  1/8/2022 0218 by Anselmo De Los Santos RN  Outcome: Ongoing  Note: Patient afebrile. Slight left facial swelling.   Antibiotics given as ordered.   1/7/2022 1842 by Brenden Schmid RN  Outcome: Ongoing

## 2022-01-08 NOTE — DISCHARGE INSTR - DIET
Good nutrition is important when healing from an illness, injury, or surgery. Follow any nutrition recommendations given to you during your hospital stay. If you were given an oral nutrition supplement while in the hospital, continue to take this supplement at home. You can take it with meals, in-between meals, and/or before bedtime. These supplements can be purchased at most local grocery stores, pharmacies, and chain [employer]-stores. If you have any questions about your diet or nutrition, call the hospital and ask for the dietitian.   Regular Adult Diet

## 2022-01-08 NOTE — PROGRESS NOTES
[address] [place] [address]    Progress Note    1/8/2022    8:59 AM    Name:   Jeremiah Gomez  MRN:     [id number]     Acct:      [de-identified]   Room:   [address] [place] [address] Day:  1  Admit Date:  1/6/2022  8:18 PM    PCP:   Palmira Smith  Code Status:  Full Code    Subjective:     C/C:   Chief Complaint   Patient presents with    Facial Swelling    Dental Problem       Interval History Status: significantly improved. Patient states that he feels much better, denies any new complaints and wants to be discharged home  Afebrile  BP stable  Respiratory status stable on room air  No leucocytosis  Hemoglobin stable  Platelets stable  Renal function  stable      Review of Systems:     Constitutional:  negative for chills, fevers, sweats  Respiratory:  negative for cough, dyspnea on exertion, shortness of breath, wheezing  Cardiovascular:  negative for chest pain, chest pressure/discomfort, lower extremity edema, palpitations  Gastrointestinal:  negative for abdominal pain, constipation, diarrhea, nausea, vomiting  Neurological:  negative for dizziness, headache    Medications: Allergies:  No Known Allergies    Current Meds:   Scheduled Meds:    sodium chloride flush  5-40 mL IntraVENous 2 times per day    enoxaparin  40 mg SubCUTAneous Daily    ampicillin-sulbactam  3,000 mg IntraVENous Q6H     Continuous Infusions:    sodium chloride       PRN Meds: sodium chloride flush, sodium chloride, acetaminophen, ondansetron **OR** ondansetron, ibuprofen, sodium chloride flush    Data:     Past Medical History:   has no past medical history on file. Social History:   reports that he has been smoking. He has never used smokeless tobacco. He reports current alcohol use of about 1.0 standard drink of alcohol per week. He reports current drug use. Drug: Marijuana Estjessica Merrill).      Family History:   Family History   Problem Relation Age of Onset    Kidney Disease Mother     High Blood Pressure Mother     Allergy (Severe) Father     Other Father     Arthritis Sister     Asthma Sister     Asthma Brother     Other Maternal Grandmother     Kidney Disease Maternal Grandfather     Heart Disease Paternal Grandfather        Vitals:  /71   Pulse 65   Temp 97.9 °F (36.6 °C) (Oral)   Resp 18   Ht 5' 8\" (1.727 m)   Wt 149 lb 14.6 oz (68 kg)   SpO2 97%   BMI 22.79 kg/m²   Temp (24hrs), Av.3 °F (36.8 °C), Min:97.9 °F (36.6 °C), Max:98.6 °F (37 °C)    No results for input(s): POCGLU in the last 72 hours. I/O (24Hr): Intake/Output Summary (Last 24 hours) at 2022 0859  Last data filed at 2022 0339  Gross per 24 hour   Intake 1760 ml   Output --   Net 1760 ml       Labs:  Hematology:  Recent Labs     22  0509   WBC 8.8 3.6*   RBC 5.07 4.86   HGB 15.3 14.7   HCT 43.5 42.3   MCV 85.7 87.1   MCH 30.2 30.3   MCHC 35.2 34.8   RDW 12.6 12.9    301   MPV 7.8 8.2     Chemistry:  Recent Labs     22  0509    142   K 4.2 4.1   CL 99 102   CO2 27 27   GLUCOSE 129* 102*   BUN 11 14   CREATININE 0.74 0.81   ANIONGAP 14 13   LABGLOM >60 >60   GFRAA >60 >60   CALCIUM 10.2 10.0     Recent Labs     22  0509   PROT 7.6   LABALBU 4.0   AST 20   ALT 24   ALKPHOS 68   BILITOT 0.73     ABG:No results found for: POCPH, PHART, PH, POCPCO2, VQD6LDF, PCO2, POCPO2, PO2ART, PO2, POCHCO3, OZY6RMD, HCO3, NBEA, PBEA, BEART, BE, THGBART, THB, YLG7WOA, ORHW4SKX, E6SDDIDD, O2SAT, FIO2  No results found for: SPECIAL  No results found for: CULTURE    Radiology:  CT FACIAL BONES W CONTRAST    Result Date: 2022  Right 2nd molar dental disease identified with apical lucency and odontogenic origin of the right maxillary sinus disease. Buckle cellulitis and phlegmon and abscess extends into the retro antral fat pad on the right. Dental consultation recommended.  RECOMMENDATIONS: Unavailable       Physical Examination:        General appearance:  alert, cooperative and no distress  Mental Status:  oriented to person, place and time and normal affect  Lungs:  clear to auscultation bilaterally, normal effort  Heart:  regular rate and rhythm, no murmur  Abdomen:  soft, nontender, nondistended, normal bowel sounds,   Extremities:  no edema, redness, tenderness in the calves  Skin:  no gross lesions, rashes, induration    Assessment:        Hospital Problems           Last Modified POA    * (Principal) Facial abscess 1/7/2022 Yes          Plan:        1.  DC Unasyn. Change antibiotics to Augmentin. Ibuprofen as needed.   Will discharge home    Yoselyn Villaseñor MD  1/8/2022  8:59 AM

## 2022-01-08 NOTE — PLAN OF CARE
Problem: Falls - Risk of:  Goal: Will remain free from falls  Description: Will remain free from falls  1/8/2022 1536 by Lisandro Garcia RN  Outcome: Ongoing   Patient ambulating in room without falls. Problem: Nutritional:  Goal: Nutritional status will improve  Description: Nutritional status will improve  1/8/2022 1536 by Lisandro Garcia RN  Outcome: Ongoing     Problem: Physical Regulation:  Goal: Will remain free from infection  Description: Will remain free from infection  1/8/2022 1536 by Lisandro Garcia RN  Outcome: Ongoing   Patient on IV Ancef.

## 2022-01-08 NOTE — CARE COORDINATION
ONGOING DISCHARGE PLAN:    Patient is alert and oriented x4. Spoke with patient regarding discharge plan and patient confirms that plan is still to return to home w/ Fiance. Denies VNS/Needs. Remains on IV Unasyn. F/U Oral Surgeon as outpt. Anticipate DC today. Will continue to follow for additional discharge needs.     Electronically signed by Ina Schmidt RN on 1/8/2022 at 10:07 AM

## 2022-01-08 NOTE — PROGRESS NOTES
Physician Progress Note      Jovani Velez  CSN #:                  [id number]  :                       2000  ADMIT DATE:       2022 8:18 PM  [address] [place] DATE:  RESPONDING  PROVIDER #:        Paulette Cornelius MD          QUERY TEXT:    Pt admitted with worsening facial swelling and pain. Patient reports having   his inferior first molar pulled on , followed by a dental appointment on   1/3  for facial swelling and pain. Pt given PCN w/o improvement. Presented to   ED  with chills, increased pain, worsening swelling and facial/ oral   abscesses. 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 ED assessment indicated two   abscesses of right cheek inside the mouth. Pt reports chills and a \"metallic   taste\" in his mouth after dentist assessed the surgical site. WBC  8.8/ Neutrophil (Seg) 74%. CT: Buckle cellulitis and phlegmon and abscess extends into the retro antral   fat pad on the right. Treatment: clindamycin , 0.9 bolus, Vanco  Options provided:  -- Buckle cellulitis with oral/ facial abscesses as a postoperative   complication  -- Buckle cellulitis with oral/ facial abscesses is not a postoperative   complication  -- Other - I will add my own diagnosis  -- Disagree - Not applicable / Not valid  -- Disagree - Clinically unable to determine / Unknown  -- Refer to Clinical Documentation Reviewer    PROVIDER RESPONSE TEXT:    Patient has buckle cellulitis with oral/ facial abscesses as a postoperative   complication.     Query created by: Anamaria Grayson on 2022 12:36 AM      Electronically signed by:  Paulette Cornelius MD 2022 12:55 PM

## 2022-01-09 NOTE — ED PROVIDER NOTES
[facility]     Emergency Department     Faculty Attestation        I performed a history and physical examination of the patient and discussed management with the resident. I reviewed the residents note and agree with the documented findings and plan of care. Any areas of disagreement are noted on the chart. I was personally present for the key portions of any procedures. I have documented in the chart those procedures where I was not present during the key portions. I have reviewed the emergency nurses triage note. I agree with the chief complaint, past medical history, past surgical history, allergies, medications, social and family history as documented unless otherwise noted below. Documentation of the HPI, Physical Exam and Medical Decision Making performed by medical students or scribes is based on my personal performance of the HPI, PE and MDM. For Physician Assistant/ Nurse Practitioner cases/documentation I have have had a face to face evaluation with this patient and have completed at least one if not all key elements of the E/M (history, physical exam, and MDM). Additional findings are as noted. Vital Signs: /71   Pulse 84   Temp 98.5 °F (36.9 °C) (Oral)   Resp 18   Ht 5' 8\" (1.727 m)   Wt 68 kg (149 lb 14.6 oz)   SpO2 97%   BMI 22.79 kg/m²   PCP:  Radha Rubio    Pertinent Comments:     History: This is a 80-year-old male who presents today for facial swelling and dental issue. He relates he can feel discharge draining inside the mouth and it tastes quite terrible. He relates he has been on amoxicillin for couple days and thinks it feels a little bit better since he started that. He has had no fever that he is aware of. But the facial swelling continues. He came in today because when he talked to the dentist they recommended it. Exam: Patient has normal vitals here.   He has obvious facial swelling to the right side of the face with what appears to be a abscess within the buccal mucosa on the inside. He has multiple dental caries. The rest of his exam seems benign at this time. CT FACIAL BONES W CONTRAST    Result Date: 1/6/2022  EXAMINATION: CT OF THE FACE WITH CONTRAST 1/6/2022 TECHNIQUE: CT of the face was performed with the administration of intravenous contrast. Multiplanar reformatted images are provided for review. Dose modulation, iterative reconstruction, and/or weight based adjustment of the mA/kV was utilized to reduce the radiation dose to as low as reasonably achievable. COMPARISON: None. HISTORY: ORDERING SYSTEM PROVIDED HISTORY: Dental infection/facial swelling TECHNOLOGIST PROVIDED HISTORY: Dental infection/facial swelling Decision Support Exception - unselect if not a suspected or confirmed emergency medical condition->Emergency Medical Condition (MA) Reason for Exam: Right side dental infection/facial swelling Additional signs and symptoms: facial pressure FINDINGS: PHARYNX/LARYNX: The palatine tonsils are normal in appearance. The tongue is normal in appearance. The valleculae, epiglottis, aryepiglottic folds and pyriform sinuses appear unremarkable. No mass or abscess is seen. SALIVARY GLANDS: The parotid and submandibular glands appear unremarkable. LYMPH NODES: Right level 2 lymph nodes likely reactive. SOFT TISSUES: Right facial and buccal cellulitis in identified. There is a retro antral fat pad inflammatory changes. There is a dental abscess identified involving retro antral fat pad on the right. This extends along the lateral aspect of the right maxillary alveolus. This is likely rising from the right seconds maxillary molar. There is an air-fluid level within the right mastoid sinus. There are multiple areas of mucosal thickening identified right maxillary sinus is well. This is a dental odontogenic sinus disease involving the right maxillary sinus.   Additional acute identified scattered throughout the dentition identified notably involving left maxillary molar. Well. Signal.  Other level BRAIN/ORBITS/SINUSES: The visualized portion of the intracranial contents appear unremarkable. The visualized portion of the orbits, mastoid air cells demonstrate no acute abnormality BONES:  No displaced fracture     Right 2nd molar dental disease identified with apical lucency and odontogenic origin of the right maxillary sinus disease. Buckle cellulitis and phlegmon and abscess extends into the retro antral fat pad on the right. Dental consultation recommended. RECOMMENDATIONS: Unavailable     Critical Care  None    (Please note that portions of this note were completed with a voice recognition program.  Efforts were made to edit the dictations but occasionally words are mis-transcribed.)    Elmira Yanez M.D.   Attending Emergency Medicine Physician       Ashley Garcia MD  01/09/22 0161

## 2022-11-29 ENCOUNTER — HOSPITAL ENCOUNTER (EMERGENCY)
Age: 22
Discharge: HOME | End: 2022-11-29
Attending: EMERGENCY MEDICINE
Payer: COMMERCIAL

## 2022-11-29 VITALS
WEIGHT: 155 LBS | TEMPERATURE: 98.4 F | HEART RATE: 73 BPM | RESPIRATION RATE: 18 BRPM | BODY MASS INDEX: 23.49 KG/M2 | SYSTOLIC BLOOD PRESSURE: 121 MMHG | HEIGHT: 68 IN | OXYGEN SATURATION: 100 % | DIASTOLIC BLOOD PRESSURE: 91 MMHG

## 2022-11-29 DIAGNOSIS — K08.89 PAIN, DENTAL: Primary | ICD-10-CM

## 2022-11-29 PROCEDURE — 99283 EMERGENCY DEPT VISIT LOW MDM: CPT

## 2022-11-29 RX ORDER — HYDROCODONE BITARTRATE AND ACETAMINOPHEN 5; 325 MG/1; MG/1
1 TABLET ORAL EVERY 8 HOURS PRN
Qty: 8 TABLET | Refills: 0 | Status: SHIPPED | OUTPATIENT
Start: 2022-11-29 | End: 2022-12-02

## 2022-11-29 RX ORDER — AMOXICILLIN AND CLAVULANATE POTASSIUM 875; 125 MG/1; MG/1
1 TABLET, FILM COATED ORAL 2 TIMES DAILY
Qty: 14 TABLET | Refills: 0 | Status: SHIPPED | OUTPATIENT
Start: 2022-11-29 | End: 2022-12-06

## 2022-11-29 ASSESSMENT — ENCOUNTER SYMPTOMS
PHOTOPHOBIA: 0
SORE THROAT: 0
FACIAL SWELLING: 0
SINUS PAIN: 0
COUGH: 0
NAUSEA: 0
SINUS PRESSURE: 0
RHINORRHEA: 0
ABDOMINAL PAIN: 0
TROUBLE SWALLOWING: 0
VOMITING: 0
STRIDOR: 0
EYE PAIN: 0
SHORTNESS OF BREATH: 0
DIARRHEA: 0

## 2022-11-29 ASSESSMENT — PAIN DESCRIPTION - ORIENTATION: ORIENTATION: RIGHT

## 2022-11-29 ASSESSMENT — LIFESTYLE VARIABLES
HOW OFTEN DO YOU HAVE A DRINK CONTAINING ALCOHOL: 2-3 TIMES A WEEK
HOW MANY STANDARD DRINKS CONTAINING ALCOHOL DO YOU HAVE ON A TYPICAL DAY: 1 OR 2

## 2022-11-29 ASSESSMENT — PAIN SCALES - GENERAL: PAINLEVEL_OUTOF10: 4

## 2022-11-29 ASSESSMENT — PAIN DESCRIPTION - DESCRIPTORS: DESCRIPTORS: ACHING

## 2022-11-29 ASSESSMENT — PAIN DESCRIPTION - LOCATION: LOCATION: TEETH

## 2022-11-29 NOTE — ED PROVIDER NOTES
[address] [facility]  Emergency Medicine Department    Pt Name: Karen Agustin  MRN: [id number]  [place] 2000  Date of evaluation: 11/29/2022  Provider: Sal Benito MD    CHIEF COMPLAINT     Chief Complaint   Patient presents with    Dental Pain     Chronic dental pain        HISTORY OF PRESENT ILLNESS  (Location/Symptom, Timing/Onset, Context/Setting,Quality, Duration, Modifying Factors, Severity.)   Karen Agustin is a 25year old male presenting for right dental pain that started 2-3 days ago and worsened in the past 24 hours. Has a history of dental abscess earlier this year treated with amoxicillin. Patient took ibuprofen and amoxicillin last night and this morning which he thinks relieved the pain, but wanted to be sure that he was not developing another abscess. Pain severity was 7/10 this morning and is currently 3/10. Saw his dentist in March who said he did have dental caries, but would have to wait on tooth extraction due to recent tooth extraction. Reports right sided mild headache. Denies fever, chills, drooling, sore throat, difficulty swallowing, facial swelling, trismus, ear pain, neck pain, neck stiffness, rhinorrhea, and shortness of breath. Nursing Notes were reviewed. ALLERGIES     Patient has no known allergies. CURRENT MEDICATIONS       Discharge Medication List as of 11/29/2022 11:12 AM          PAST MEDICAL HISTORY   History reviewed. No pertinent past medical history. SURGICAL HISTORY     History reviewed. No pertinent surgical history.     FAMILY HISTORY           Problem Relation Age of Onset    Kidney Disease Mother     High Blood Pressure Mother     Allergy (Severe) Father     Other Father     Arthritis Sister     Asthma Sister     Asthma Brother     Other Maternal Grandmother     Kidney Disease Maternal Grandfather     Heart Disease Paternal Grandfather      Family Status   Relation Name Status    Mother  (Not Specified)    Father (Not Specified)    Sister  (Not Specified)    Brother  (Not Specified)    MGM  (Not Specified)    MGF  (Not Specified)    PGF  (Not Specified)        SOCIAL HISTORY      reports that he has been smoking. He has never used smokeless tobacco. He reports current alcohol use of about 1.0 standard drink per week. He reports current drug use. Drug: Marijuana Sandra Pleas). REVIEW OF SYSTEMS    (2-9 systems for level 4, 10 or more for level 5)     Review of Systems   Constitutional:  Negative for fever. HENT:  Positive for dental problem. Negative for facial swelling. Respiratory:  Negative for shortness of breath. Gastrointestinal:  Negative for nausea and vomiting. Allergic/Immunologic: Negative for immunocompromised state. Neurological:  Positive for headaches. PHYSICAL EXAM    (up to 7 for level 4, 8 or more for level 5)     ED Triage Vitals   BP Temp Temp src Heart Rate Resp SpO2 Height Weight   11/29/22 1001 11/29/22 1003 -- 11/29/22 1001 11/29/22 1001 11/29/22 1001 11/29/22 1001 11/29/22 1001   (!) 121/91 98.4 °F (36.9 °C)  73 18 100 % 5' 8\" (1.727 m) 155 lb (70.3 kg)       Physical Exam  Vitals and nursing note reviewed. Constitutional:       General: He is not in acute distress. Appearance: Normal appearance. He is not ill-appearing. HENT:      Head: Normocephalic and atraumatic. Comments: No facial swelling     Nose: Nose normal.      Mouth/Throat:      Mouth: Mucous membranes are moist.      Comments: Tenderness to percussion of the right upper second molar with no periapical abscess  Eyes:      Extraocular Movements: Extraocular movements intact. Cardiovascular:      Pulses: Normal pulses. Pulmonary:      Effort: Pulmonary effort is normal. No respiratory distress. Musculoskeletal:         General: No deformity or signs of injury. Normal range of motion. Cervical back: Normal range of motion and neck supple. Skin:     General: Skin is warm and dry.    Neurological: General: No focal deficit present. Mental Status: He is alert and oriented to person, place, and time. Psychiatric:         Mood and Affect: Mood normal.         Behavior: Behavior normal.       DIAGNOSTIC RESULTS     RADIOLOGY:   Non-plain film images such as CT, Ultrasound and MRI are read by theradiologist. Plain radiographic images are visualized and preliminarily interpreted by the emergency physician with the below findings:    None indicated    ED BEDSIDE ULTRASOUND:   Performed by ED Physician - none    LABS:  Labs Reviewed - No data to display    All other labs were within normal range or not returned as of this dictation. EMERGENCYDEPARTMENT COURSE and DIFFERENTIAL DIAGNOSIS/MDM:   Vitals:    Vitals:    11/29/22 1001 11/29/22 1003   BP: (!) 121/91    Pulse: 73    Resp: 18    Temp:  98.4 °F (36.9 °C)   SpO2: 100%    Weight: 155 lb (70.3 kg)    Height: 5' 8\" (1.727 m)      24-year-old male presenting with right upper dental pain with no evidence of deep space infection on exam.  Will provide prescription for antibiotic for possible infected root and also provide a short course of pain medication to use as needed. CONSULTS:  None    PROCEDURES:  None indicated    FINAL IMPRESSION     1. Pain, dental          DISPOSITION/PLAN   DISPOSITION Decision To Discharge 11/29/2022 11:11:42 AM    PATIENT REFERRED TO:   [facility].  [address], [address] [id number]  [phone]  Schedule an appointment as soon as possible for a visit   For Follow up    DISCHARGE MEDICATIONS:     Discharge Medication List as of 11/29/2022 11:12 AM        START taking these medications    Details   amoxicillin-clavulanate (AUGMENTIN) 875-125 MG per tablet Take 1 tablet by mouth 2 times daily for 7 days, Disp-14 tablet, R-0Normal      HYDROcodone-acetaminophen (NORCO) 5-325 MG per tablet Take 1 tablet by mouth every 8 hours as needed for Pain for up to 3 days. Intended supply: 3 days.  Take lowest dose possible to manage pain, Disp-8 tablet, R-0Normal           (Please note that portions of this note were completed with a voice recognition program.  Efforts were made to edit the dictations but occasionally words are mis-transcribed.)    Phil Ybarra MD  Attending Emergency Physician         Phil Ybarra MD  11/29/22 [phone]

## 2022-11-29 NOTE — ED NOTES
EMERGENCY DEPARTMENT [facility] Student Note    Pt Name: Constance Ferrer  MRN: [id number]  [place] 2000  Date of evaluation: 11/29/22  CHIEF COMPLAINT       Chief Complaint   Patient presents with    Dental Pain     Chronic dental pain      HISTORY OF PRESENT ILLNESS   Patient is a 25year old male presenting for right dental pain that started 2-3 days ago and worsened in the past 24 hours. Has a history of dental abscess earlier this year treated with amoxicillin. Patient took ibuprofen and amoxicillin last night and this morning which he thinks relieved the pain, but wanted to be sure that he was not developing another abscess. Pain severity was 7/10 this morning and is currently 3/10. Saw his dentist in March who said he did have dental caries, but would have to wait on tooth extraction due to recent tooth extraction. Reports right sided mild headache. Denies fever, chills, drooling, sore throat, difficulty swallowing, facial swelling, trismus, ear pain, neck pain, neck stiffness, rhinorrhea, and shortness of breath. REVIEW OF SYSTEMS     Review of Systems   Constitutional:  Negative for chills, fatigue and fever. HENT:  Positive for dental problem. Negative for congestion, drooling, ear discharge, ear pain, facial swelling, mouth sores, rhinorrhea, sinus pressure, sinus pain, sore throat and trouble swallowing. Eyes:  Negative for photophobia, pain and visual disturbance. Respiratory:  Negative for cough, shortness of breath and stridor. Cardiovascular:  Negative for chest pain. Gastrointestinal:  Negative for abdominal pain, diarrhea, nausea and vomiting. Musculoskeletal:  Negative for neck pain and neck stiffness. Neurological:  Negative for headaches. PASTMEDICAL HISTORY   History reviewed. No pertinent past medical history. Past Problem List  Patient Active Problem List   Diagnosis Code    Facial abscess L02.01     SURGICAL HISTORY     History reviewed.  No pertinent surgical history. CURRENT MEDICATIONS       Previous Medications    No medications on file     ALLERGIES     has No Known Allergies. FAMILY HISTORY     He indicated that the status of his mother is unknown. He indicated that the status of his father is unknown. He indicated that the status of his sister is unknown. He indicated that the status of his brother is unknown. He indicated that the status of his maternal grandmother is unknown. He indicated that the status of his maternal grandfather is unknown. He indicated that the status of his paternal grandfather is unknown. SOCIAL HISTORY       Social History     Tobacco Use    Smoking status: Every Day    Smokeless tobacco: Never    Tobacco comments:     vapes   Vaping Use    Vaping Use: Every day    Substances: Nicotine, Flavoring, saltnic    Devices: Disposable, Pre-filled or refillable cartridge   Substance Use Topics    Alcohol use: Yes     Alcohol/week: 1.0 standard drink     Types: 1 Shots of liquor per week     Comment: rarely drinks. but when he does it is usually 1 liquor    Drug use: Yes     Types: Marijuana Eleazar Passy)     Comment: multiple times a day     PHYSICAL EXAM     INITIAL VITALS: BP (!) 121/91   Pulse 73   Temp 98.4 °F (36.9 °C)   Resp 18   Ht 5' 8\" (1.727 m)   Wt 155 lb (70.3 kg)   SpO2 100%   BMI 23.57 kg/m²    Physical Exam  Constitutional:       General: He is not in acute distress. Appearance: Normal appearance. He is not ill-appearing or toxic-appearing. HENT:      Head: Normocephalic and atraumatic. Right Ear: Tympanic membrane, ear canal and external ear normal.      Left Ear: Tympanic membrane, ear canal and external ear normal.      Nose: Nose normal.      Mouth/Throat:      Mouth: Mucous membranes are moist.      Pharynx: Oropharynx is clear. Comments: Tenderness to percussion of tooth #3. No erythema or edema of the gingiva. No edema of the floor of the mouth or tongue. No drooling or trismus.  Full ROM of jaw. Eyes:      Pupils: Pupils are equal, round, and reactive to light. Cardiovascular:      Rate and Rhythm: Normal rate and regular rhythm. Pulmonary:      Effort: Pulmonary effort is normal.      Breath sounds: Normal breath sounds. Abdominal:      General: Abdomen is flat. Tenderness: There is no abdominal tenderness. Musculoskeletal:      Cervical back: Normal range of motion and neck supple. No rigidity or tenderness. Lymphadenopathy:      Cervical: No cervical adenopathy. Skin:     General: Skin is warm and dry. Neurological:      General: No focal deficit present. Mental Status: He is alert. Psychiatric:         Mood and Affect: Mood normal.         Behavior: Behavior normal.       MEDICAL DECISION MAKING:   This is a 25year old male with history of dental abscess presenting for right sided dental pain. Given patient is afebrile, there is no facial or neck edema or tenderness, no edema of the floor of the mouth or tongue, deep space neck infection, Will angina, and dental abscess not likely. Likely pulpitis. Plan to provide patient with dental referral, augmentin, and pain management.     KESHIA May-JAMI  11/29/22 [facility]  11/29/22 [id number]